# Patient Record
Sex: MALE | Race: WHITE | Employment: FULL TIME | URBAN - METROPOLITAN AREA
[De-identification: names, ages, dates, MRNs, and addresses within clinical notes are randomized per-mention and may not be internally consistent; named-entity substitution may affect disease eponyms.]

---

## 2021-12-17 ENCOUNTER — OFFICE VISIT (OUTPATIENT)
Dept: ORTHOPEDIC SURGERY | Age: 51
End: 2021-12-17
Payer: COMMERCIAL

## 2021-12-17 VITALS — BODY MASS INDEX: 29.73 KG/M2 | HEIGHT: 66 IN | WEIGHT: 185 LBS

## 2021-12-17 DIAGNOSIS — M25.562 ACUTE PAIN OF LEFT KNEE: Primary | ICD-10-CM

## 2021-12-17 PROCEDURE — 99213 OFFICE O/P EST LOW 20 MIN: CPT | Performed by: ORTHOPAEDIC SURGERY

## 2021-12-17 RX ORDER — ASPIRIN 81 MG/1
TABLET ORAL DAILY
COMMUNITY

## 2021-12-17 NOTE — PROGRESS NOTES
Robin Metz (: 1970) is a 54 y.o. male patient, here for evaluation of the following chief complaint(s):  Knee Pain (left knee pain)       ASSESSMENT/PLAN:  Below is the assessment and plan developed based on review of pertinent history, physical exam, labs, studies, and medications. 46-year-old male with persistent left knee pain. Is been going on for more than a year. He has had conservative treatment including oral medication physical therapy and bracing. The pain is still rated as moderate and is primarily anteriorly based. Of note he did have a previous patella dislocation many years ago. I would like to get an MRI to further characterize his cartilage as well as his menisci. Follow-up when complete. 1. Acute pain of left knee  -     MRI KNEE LT WO CONT; Future      Encounter Diagnosis   Name Primary?  Acute pain of left knee Yes        No follow-ups on file. SUBJECTIVE/OBJECTIVE:  Robin Metz (: 1970) is a 54 y.o. male who presents today for the following:  Chief Complaint   Patient presents with    Knee Pain     left knee pain       46-year-old male with persistent left knee pain. Is been going on for more than a year. He has had conservative treatment including oral medication physical therapy and bracing. The pain is still rated as moderate and is primarily anteriorly based. Of note he did have a previous patella dislocation many years ago. IMAGING:  XR Results (most recent):  Results from Hospital Encounter encounter on 10/07/14    XR CHEST PORT    Narrative  **Final Report**      ICD Codes / Adm. Diagnosis: 724.5  729.1 / Backache, unspecified  Myalgia and  myositis, unspecif  Examination:  CR CHEST PORT  - [id number] - Oct 17 2014  5:36PM  Accession No:  [id number]  Reason:  right picc tip placement      REPORT:  EXAM:  CR CHEST PORT    INDICATION:  right picc tip placement    COMPARISON:  None.     FINDINGS:    A portable AP radiograph of the chest was obtained at 17:31 hours. The tip  of the right PICC line is in the region of the atriocaval junction, which is  appropriate radiographically. The lungs are clear. The cardiac and  mediastinal contours and pulmonary vascularity are normal.  The bones and  soft tissues are unremarkable. Impression  :    No acute process. PICC line in appropriate position radiographically. Signing/Reading Doctor: Natalie Easton ([id number])  Approved: Natalie Easton ([id number])  Oct 17 2014  6:43PM       Allergies   Allergen Reactions    Codeine Nausea and Vomiting    Shellfish Derived Hives, Shortness of Breath and Itching       Current Outpatient Medications   Medication Sig    omeprazole magnesium (PRILOSEC PO) Take  by mouth.  fluticasone propionate (FLONASE NA) by Nasal route.  ergocalciferol, vitamin D2, (VITAMIN D2 PO) Take  by mouth.  aspirin delayed-release 81 mg tablet Take  by mouth daily.  TESTOSTERONE BU by Buccal route.  CHORIONIC GONADOTROPIN, HUMAN INJECTION by Injection route.  acetaminophen (TYLENOL) 325 mg tablet Take 2 tablets by mouth every four (4) hours as needed for Pain.  valacyclovir (VALTREX) 500 mg tablet Take 500 mg by mouth two (2) times a day.  bisacodyl (DULCOLAX) 10 mg suppository Insert 10 mg into rectum daily as needed for Other (constipation). (Patient not taking: Reported on 12/17/2021)    cyclobenzaprine (FLEXERIL) 10 mg tablet Take 1 tablet by mouth three (3) times daily as needed for Muscle Spasm(s). (Patient not taking: Reported on 12/17/2021)    loratadine (CLARITIN) 10 mg tablet Take 1 tablet by mouth daily. (Patient not taking: Reported on 12/17/2021)    oxyCODONE IR (ROXICODONE) 5 mg immediate release tablet Take 1 tablet by mouth every four (4) hours as needed for Pain (Take 1-2 tablets as needed for pain). (Patient not taking: Reported on 12/17/2021)    tamsulosin (FLOMAX) 0.4 mg capsule Take 0.4 mg by mouth daily.  (Patient not taking: Reported on 12/17/2021)     No current facility-administered medications for this visit. No past medical history on file. No past surgical history on file. No family history on file. Social History     Tobacco Use    Smoking status: Never Smoker    Smokeless tobacco: Not on file   Substance Use Topics    Alcohol use: Yes        All systems reviewed x 12 and were negative with the exception of none      No flowsheet data found. Vitals:  Ht 5' 6\" (1.676 m)   Wt 185 lb (83.9 kg)   BMI 29.86 kg/m²    Body mass index is 29.86 kg/m². Physical Exam    General: NAD, well developed, well nourished, alert and oriented x 3. Cardiac: Extremities well perfused    Respiratory: Nonlabored breathing    LLE: Normal gait and station. Negative stinchfield. No effusion noted. No previous incisions noted. ROM 0-120 degrees. Grossly stable to varus/valgus stress and anterior/posterior drawer tests. Mild medial joint tenderness. Mild anterior tenderness. Motor grossly intact. RLE: Normal gait and station. Negative stinchfield. No effusion noted. No previous incisions noted. ROM 0-120 degrees. Grossly stable to varus/valgus stress and anterior/posterior drawer tests. Negative McMurrays. Motor grossly intact. Vascular: Palpable pedal pulses, equal bilaterally. Skin: Warm well perfused, cap refill < 2 sec. An electronic signature was used to authenticate this note.   -- Gallito Jones MD

## 2022-01-14 ENCOUNTER — OFFICE VISIT (OUTPATIENT)
Dept: ORTHOPEDIC SURGERY | Age: 52
End: 2022-01-14
Payer: COMMERCIAL

## 2022-01-14 DIAGNOSIS — M17.12 ARTHRITIS OF LEFT KNEE: Primary | ICD-10-CM

## 2022-01-14 PROCEDURE — 99213 OFFICE O/P EST LOW 20 MIN: CPT | Performed by: ORTHOPAEDIC SURGERY

## 2022-01-14 NOTE — PROGRESS NOTES
Cari Wagner (: 1970) is a 54 y.o. male patient, here for evaluation of the following chief complaint(s):  Knee Pain (left knee MRI results)       ASSESSMENT/PLAN:  Below is the assessment and plan developed based on review of pertinent history, physical exam, labs, studies, and medications. 46-year-old male comes in today for follow-up with his left knee. He has had an MRI in the interim that revealed tricompartmental knee osteoarthritis with high-grade changes medially and laterally. We had a long discussion today. He has been dealing with this for more than a year and this tired of it. Hurts him every day and prevents him from doing what he wants. He has had conservative management in the form of physical therapy oral medication weight loss, bracing and medication. At this point I think his only further option would be considering total knee. He would like to proceed as an outpatient      1. Arthritis of left knee      Encounter Diagnosis   Name Primary?  Arthritis of left knee Yes        No follow-ups on file. SUBJECTIVE/OBJECTIVE:  Cari Wagner (: 1970) is a 54 y.o. male who presents today for the following:  Chief Complaint   Patient presents with    Knee Pain     left knee MRI results       46-year-old male comes in today for follow-up. He continues to have left knee pain that is daily. The pain is moderate. It bothers him all the time. Present from doing the activities he like to do. He has failed more than a year of conservative treatment    IMAGING:  XR Results (most recent):  Results from Hospital Encounter encounter on 10/07/14    XR CHEST PORT    Narrative  **Final Report**      ICD Codes / Adm. Diagnosis: 724.5  729.1 / Backache, unspecified  Myalgia and  myositis, unspecif  Examination:  CR CHEST PORT  - [id number] - Oct 17 2014  5:36PM  Accession No:  [id number]  Reason:  right picc tip placement      REPORT:  EXAM:  CR CHEST PORT    INDICATION: right picc tip placement    COMPARISON:  None. FINDINGS:    A portable AP radiograph of the chest was obtained at 17:31 hours. The tip  of the right PICC line is in the region of the atriocaval junction, which is  appropriate radiographically. The lungs are clear. The cardiac and  mediastinal contours and pulmonary vascularity are normal.  The bones and  soft tissues are unremarkable. Impression  :    No acute process. PICC line in appropriate position radiographically. Signing/Reading Doctor: Kelly Carlisle ([id number])  Approved: Kelly Carlisle ([id number])  Oct 17 2014  6:43PM       Allergies   Allergen Reactions    Codeine Nausea and Vomiting    Shellfish Derived Hives, Shortness of Breath and Itching       Current Outpatient Medications   Medication Sig    omeprazole magnesium (PRILOSEC PO) Take  by mouth.  fluticasone propionate (FLONASE NA) by Nasal route.  ergocalciferol, vitamin D2, (VITAMIN D2 PO) Take  by mouth.  aspirin delayed-release 81 mg tablet Take  by mouth daily.  TESTOSTERONE BU by Buccal route.  CHORIONIC GONADOTROPIN, HUMAN INJECTION by Injection route.  acetaminophen (TYLENOL) 325 mg tablet Take 2 tablets by mouth every four (4) hours as needed for Pain.  bisacodyl (DULCOLAX) 10 mg suppository Insert 10 mg into rectum daily as needed for Other (constipation). (Patient not taking: Reported on 12/17/2021)    cyclobenzaprine (FLEXERIL) 10 mg tablet Take 1 tablet by mouth three (3) times daily as needed for Muscle Spasm(s). (Patient not taking: Reported on 12/17/2021)    loratadine (CLARITIN) 10 mg tablet Take 1 tablet by mouth daily. (Patient not taking: Reported on 12/17/2021)    oxyCODONE IR (ROXICODONE) 5 mg immediate release tablet Take 1 tablet by mouth every four (4) hours as needed for Pain (Take 1-2 tablets as needed for pain). (Patient not taking: Reported on 12/17/2021)    tamsulosin (FLOMAX) 0.4 mg capsule Take 0.4 mg by mouth daily.  (Patient not taking: Reported on 12/17/2021)    valacyclovir (VALTREX) 500 mg tablet Take 500 mg by mouth two (2) times a day. No current facility-administered medications for this visit. No past medical history on file. No past surgical history on file. No family history on file. Social History     Tobacco Use    Smoking status: Never Smoker    Smokeless tobacco: Not on file   Substance Use Topics    Alcohol use: Yes        All systems reviewed x 12 and were negative with the exception of None      No flowsheet data found. Vitals: There were no vitals taken for this visit. There is no height or weight on file to calculate BMI. Physical Exam    General: NAD, well developed, well nourished, alert and oriented x 3. Cardiac: Extremities well perfused    Respiratory: Nonlabored breathing    LLE antalgic gait. Mild effusion noted. No previous incisions noted. ROM 0-110 degrees. Grossly stable to varus/valgus stress and anterior/posterior drawer tests. Medial and lateral joint tenderness. Motor grossly intact. RLE: Normal gait and station. Negative stinchfield. No effusion noted. No previous incisions noted. ROM 0-120 degrees. Grossly stable to varus/valgus stress and anterior/posterior drawer tests. Negative McMurrays. Motor grossly intact. Vascular: Palpable pedal pulses, equal bilaterally. Skin: Warm well perfused, cap refill < 2 sec. An electronic signature was used to authenticate this note.   -- Yanely Delgado MD

## 2022-01-18 DIAGNOSIS — M17.12 ARTHRITIS OF LEFT KNEE: Primary | ICD-10-CM

## 2022-01-18 RX ORDER — DICLOFENAC SODIUM 75 MG/1
75 TABLET, DELAYED RELEASE ORAL 2 TIMES DAILY WITH MEALS
Qty: 60 TABLET | Refills: 0 | Status: SHIPPED | OUTPATIENT
Start: 2022-01-18 | End: 2022-02-09

## 2022-02-09 DIAGNOSIS — M17.12 ARTHRITIS OF LEFT KNEE: ICD-10-CM

## 2022-02-09 RX ORDER — DICLOFENAC SODIUM 75 MG/1
TABLET, DELAYED RELEASE ORAL
Qty: 60 TABLET | Refills: 0 | Status: SHIPPED | OUTPATIENT
Start: 2022-02-09